# Patient Record
Sex: FEMALE | Race: WHITE
[De-identification: names, ages, dates, MRNs, and addresses within clinical notes are randomized per-mention and may not be internally consistent; named-entity substitution may affect disease eponyms.]

---

## 2020-10-01 ENCOUNTER — HOSPITAL ENCOUNTER (OUTPATIENT)
Dept: HOSPITAL 47 - WWCWWP | Age: 50
Discharge: HOME | End: 2020-10-01
Attending: SURGERY
Payer: COMMERCIAL

## 2020-10-01 VITALS
SYSTOLIC BLOOD PRESSURE: 114 MMHG | HEART RATE: 83 BPM | DIASTOLIC BLOOD PRESSURE: 75 MMHG | RESPIRATION RATE: 18 BRPM | TEMPERATURE: 98.1 F

## 2020-10-01 DIAGNOSIS — Z53.9: Primary | ICD-10-CM

## 2020-10-01 NOTE — P.GSHP
History of Present Illness


H&P Date: 10/01/20


Chief Complaint:  lobular carcinoma in situ anterior stereotactic core biopsy  

right breast








      Parris is a 49-year-old white female who was initially seen in 2019 with a complaint of some nodularity in the right axillary area.  This has 

resolved.  The patient had a bilateral mammogram performed on 7620.  After the

special views of the right breast were requested.  Those were performed on 

[address].  After review of these 2 areas of calcification were identified and 

stereotactic core biopsy of these 2 areas was recommended.  The patient does not

feel any lumps masses or nodules in her breasts.  No nipple discharge.  No 

recent history of trauma or infection in the breast.


     She underwent stereotactic core biopsy of 2 areas of concern in the right 

breast and [address]14[address].  The posterior site revealed benign fibrocystic changes.  

The anterior site revealed lobular neoplasia, ALH/LCIS.


     It has been recommended that she undergo needle localization and excisional

lumpectomy of the anterior site.  Did not have any complaints following the 

stereotactic core biopsy.








Caffeine: One pop per day


Smoke: She smokes in the evening and is exposed to secondhand smoke


Theophylline: Occasional


Hormones: Negative





Family History:


none





Hormonal history:


menarche: 11


, breast fed: no, first born at 23


periods regular


BCP: 5 years, tubal-ligation done 23 years ago


hormones:none





Surgical History:


1. tubal-ligation


2. knee arthroscopic





Medial History:


1. rheumatoid arthritis








Social History:


smoke: at night


alcohol: none


drugs: none





 


 








- Constitutional


Constitutional: Denies chills, Denies fever





- EENT


Comment: 





wears glasses


Eyes: denies blurred vision, denies pain


Ears: deny: decreased hearing, tinnitus


Ears, nose, mouth and throat: Denies headache, Denies sore throat





- Breasts


Breasts: bilateral: as per HPI





- Cardiovascular


Cardiovascular: Denies chest pain, Denies shortness of breath





- Respiratory


Respiratory: Denies cough, Denies 7





- Gastrointestinal


Gastrointestinal: Denies abdominal pain, Denies diarrhea, Denies nausea, Denies 

vomiting





- Genitourinary (Female)


Genitourinary: Denies dysuria, Denies hematuria





- Menstruation


Menstruation: Reports postmenopausal





- Musculoskeletal


Comment: 





arthritis





- Integumentary


Integumentary: Denies pruritus, Denies rash





- Neurological


Neurological: Denies numbness, Denies weakness





- Psychiatric


Psychiatric: Denies anxiety, Denies depression





- Endocrine


Endocrine: Denies fatigue, Denies weight change





- Hematologic/Lymphatic


Comment: 





none





- Allergic/Immunologic


Allergic/Immunologic: Reports as per HPI





Past Medical History


Past Medical History: Rheumatoid Arthritis (RA)


History of Any Multi-Drug Resistant Organisms: None Reported


Past Surgical History: Orthopedic Surgery, Tubal Ligation


Additional Past Surgical History / Comment(s): bilateral knee arthroscopy


Past Anesthesia/Blood Transfusion Reactions: No Reported Reaction


Past Psychological History: No Psychological Hx Reported


Smoking Status: Current some day smoker


Past Alcohol Use History: None Reported


Past Drug Use History: None Reported





Medications and Allergies


                                Home Medications











 Medication  Instructions  Recorded  Confirmed  Type


 


Multivit with Calcium,Iron,Min 1 each PO QAM 08/06/20 10/01/20 History





[Women's Multivitamin]    








                                    Allergies











Allergy/AdvReac Type Severity Reaction Status Date / Time


 


codeine Allergy  Rash/Hives Unverified 10/01/20 09:10


 


Penicillins Allergy  Anaphylaxis Unverified 10/01/20 09:10














Surgical - Exam


                                   Vital Signs











Temp Pulse Resp BP Pulse Ox


 


 98.1 F   83   18   114/75   97 


 


 10/01/20 09:07  10/01/20 09:07  10/01/20 09:07  10/01/20 09:07  10/01/20 09:07














BMI 26





- General


well developed, well nourished, no distress





- Eyes


normal ocular movement





- ENT


no hearing loss, no congestion





- Neck


no masses, trachea midline





- Respiratory


normal respiratory effort, clear to auscultation





- Cardiovascular


Rhythm: regular


Heart Sounds: normal: S1, S2





- Abdomen


Abdomen: soft, non tender, no guarding, no rigid, no rebound





- Integumentary





normal turgor





- Neurologic


no disoriented, no combative





- Musculoskeletal


normal gait, normal posture





- Psychiatric


oriented to time, oriented to person, oriented to place, speech is normal, 

memory intact





Breast exam:


Bra: 34C


inspection:grade 3 ptosis bilateral


palpation:


right breast: Multi-positional exam well-healed scars from prior stereo biopsy, 

no dominant masses or nodules of concern, fibrocystic changes


Right axilla: No adenopathy of concern


Left breast: Multiple positional exam fibrocystic changes, no dominant masses or

nodules of concern


Left axilla: No adenopathy of concern











Results





Bilateral mammogram and pathology results reviewed





Assessment and Plan


Assessment: 





Impression:


1.  Fibrocystic breast changes


2.  LCIS right breast anterior biopsy site


3.  History of arthritis





Plan:


1.  Right breast needle localization lumpectomy of anterior stereotactic core 

biopsy site,  possible mastopexy, possible onco plastic tissue transfer, any 

indicated procedures





Risks and benefits of the procedure discussed with the patient and she 

understands and wishes to proceed.





Cc: Dr. Martinez





encounter 30 minutes, > 50% of time in planning and counselling

## 2020-10-06 ENCOUNTER — HOSPITAL ENCOUNTER (OUTPATIENT)
Dept: HOSPITAL 47 - OR | Age: 50
Discharge: HOME | End: 2020-10-06
Attending: SURGERY
Payer: COMMERCIAL

## 2020-10-06 VITALS — TEMPERATURE: 98 F | SYSTOLIC BLOOD PRESSURE: 124 MMHG | DIASTOLIC BLOOD PRESSURE: 71 MMHG | HEART RATE: 97 BPM

## 2020-10-06 VITALS — RESPIRATION RATE: 16 BRPM

## 2020-10-06 DIAGNOSIS — Z88.5: ICD-10-CM

## 2020-10-06 DIAGNOSIS — Z98.51: ICD-10-CM

## 2020-10-06 DIAGNOSIS — N60.11: ICD-10-CM

## 2020-10-06 DIAGNOSIS — D05.11: Primary | ICD-10-CM

## 2020-10-06 DIAGNOSIS — Z98.890: ICD-10-CM

## 2020-10-06 DIAGNOSIS — Z88.0: ICD-10-CM

## 2020-10-06 DIAGNOSIS — F17.210: ICD-10-CM

## 2020-10-06 DIAGNOSIS — M06.9: ICD-10-CM

## 2020-10-06 PROCEDURE — 88307 TISSUE EXAM BY PATHOLOGIST: CPT

## 2020-10-06 PROCEDURE — 19301 PARTIAL MASTECTOMY: CPT

## 2020-10-06 PROCEDURE — 88342 IMHCHEM/IMCYTCHM 1ST ANTB: CPT

## 2020-10-06 PROCEDURE — 81025 URINE PREGNANCY TEST: CPT

## 2020-10-06 PROCEDURE — 88341 IMHCHEM/IMCYTCHM EA ADD ANTB: CPT

## 2020-10-06 PROCEDURE — 76098 X-RAY EXAM SURGICAL SPECIMEN: CPT

## 2020-10-06 PROCEDURE — 19281 PERQ DEVICE BREAST 1ST IMAG: CPT

## 2020-10-06 RX ADMIN — FENTANYL CITRATE PRN MCG: 50 INJECTION, SOLUTION INTRAMUSCULAR; INTRAVENOUS at 18:31

## 2020-10-06 RX ADMIN — FENTANYL CITRATE PRN MCG: 50 INJECTION, SOLUTION INTRAMUSCULAR; INTRAVENOUS at 18:54

## 2020-10-06 NOTE — P.DS
Providers


Attending physician: 


Lavinai Spicer





Primary care physician: 


Cydney Loving








Plan - Discharge Summary


Discharge Rx Participant: No


New Discharge Prescriptions: 


No Action


   Multivit with Calcium,Iron,Min [Women's Multivitamin] 1 each PO QAM


Discharge Medication List





Multivit with Calcium,Iron,Min [Women's Multivitamin] 1 each PO QAM 08/06/20 

[History]








Follow up Appointment(s)/Referral(s): 


Lavinia Spicer MD [STAFF PHYSICIAN] - 1 Week


Activity/Diet/Wound Care/Special Instructions: 


Prescription for Norco given to  in pre-op who will fill downstairs at 

pharmacy while patient in surgery, this is cancelled secondary to codeine 

allergy


Discharge Disposition: HOME SELF-CARE

## 2020-10-06 NOTE — P.OP
Date of Procedure: 10/06/20


Preoperative Diagnosis: 


Breast right at 12:00 stereotactic core biopsy lesion atypical lobular 

hyperplasia/lobular carcinoma in situ


Postoperative Diagnosis: 


Same


Procedure(s) Performed: 


needle Localization lumpectomy, crescent mastopexy, oncho plastic tissue 

transfer


Anesthesia: GETA


Surgeon: Lavinia Spicer


Estimated Blood Loss (ml): 5


IV fluids (ml): 850


Pathology: other (Dense breast tissue)


Condition: stable


Disposition: same day


Indications for Procedure: 








 core Biopsy with lobular carcinoma in situ


Operative Findings: 


Dense breast tissue


Description of Procedure: 


The patient is a 50-year-old white female who underwent a stereotactic core 

biopsy of the right breast.  The anterior lesion revealed lobular carcinoma in 

situ and atypical lobular hyperplasia.  She was recommended to undergo a needle 

local excisional lumpectomy.  Following needle localization of the area of 

concern in the right breast the patient was marked in the preoperative area for 

the incision for removal which was a crescent mastopexy incision.  She is 

brought to the operating room and following induction of anesthesia the right 

breast was prepped and draped in a sterile fashion.  The area which had been m

arked in the preoperative area was again delineated.  The apex of the crescent 

was approximately 1-1/2 cm above the areolar.  The skin in this area was de-

epithelialized.  The breast parenchyma was entered.  The was identified and 

circumferential dissection of the tissue around the needle was performed.  The 

specimen was painted for orientation.  The wound was evaluated for hemostasis.  

After assured that hemostasis was attained medial and lateral pillars were free.

 The medial portal was approximately 7 x 4 cm which was 28 cm and the lateral 

pillar was approximately 6 x 4 cm which was 24 cm.  Anterolateral 52 cm was 

freed.  After assured that hemostasis was attained the 2 pillars were brought 

together using 3-0 Vicryl suture.  The broad of the biopsy cavity was marked 

using titanium clips.  One clip was medial and 3 clips superiorly.  Following 

this the subcutaneous tissue was closed with 3-0 Vicryl suture.  This is 

followed by 4-0 subcuticular 4-0 Ethibond suture and a 4-0 nylon skin suture.  

All instrument and sponge counts were correct at the end of the case.  Patient 

tolerated the procedure in stable condition.

## 2020-10-07 NOTE — MM
EXAMINATION TYPE: MG pre op needle loc RT, MG surgical specimen RT

 

DATE OF EXAM: 10/6/2020

 

COMPARISON: Prior mammograms July 31, 2020 and older studies.

 

CLINICAL HISTORY: Recent High risk biopsy with ALH/LCIS.

 

TECHNIQUE: Needle localization with wire placement and surgical excision of area
of concern in the right breast.  

 

FINDINGS: The procedure of needle localization with wire placement and than 
surgical excision was explained to the patient.  Benefits, alternatives, and 
risks were discussed.  An informed consent was then obtained.  

 

Exam is reviewed. More anterior clip corresponds to area of concern on 
pathology. The shortest pathway for procedure was chosen.  Shortest pathway was 
lateral approach. The overlying skin was prepped and draped in usual sterile 
fashion.  Lidocaine is used as anesthetic into the skin and subcutaneous tissue 
up to the level of area of concern.  A 7 cm needle was used.  It was placed via 
a lateral approach under mammographic guidance.  Subsequent 90 degrees mammogram
show the needle to be in satisfactory position relative to the targeted area.  
At this point, wire was placed and the needle was withdrawn.  The wire was fixed
to patient's skin.  Images were marked for surgeon.  

 

The patient tolerated the procedure well without any immediate complication.  
The patient was kept in the radiology department for short stay after the 
procedure and then taken to surgery for surgical excision. 

 

 Targeted clip and wire are identified in specimen mammogram.  The patient was 
kept in hospital for short stay after the procedure and then discharged home in 
stable condition. The nontargeted clip also seen in periphery of specimen.

 

IMPRESSION: Successful, uncomplicated needle localization with wire placement 
and surgical excision of targeted clip in the right breast, full pathology 
results to follow.  

 

Pathology Results: Malignant

 

RIGHT BREAST, LUMPECTOMY:  Duct carcinoma in situ, intermediate grade less than 
2 mm away from the black inked margin of resection in multiple portions of the 
specimen.

Surrounding breast parenchyma:  Fibrocystic spectrum changes with prior biopsy 
artifact.  See note. 



Recommendation

Surgical consult of the right breast.

TRUNG

## 2020-10-15 ENCOUNTER — HOSPITAL ENCOUNTER (OUTPATIENT)
Dept: HOSPITAL 47 - WWCWWP | Age: 50
Discharge: HOME | End: 2020-10-15
Attending: SURGERY
Payer: COMMERCIAL

## 2020-10-15 VITALS
SYSTOLIC BLOOD PRESSURE: 108 MMHG | DIASTOLIC BLOOD PRESSURE: 73 MMHG | TEMPERATURE: 98.1 F | HEART RATE: 67 BPM | RESPIRATION RATE: 12 BRPM

## 2020-10-15 DIAGNOSIS — Z53.9: Primary | ICD-10-CM

## 2020-10-15 NOTE — P.PN
Progress Note - Text


Progress Note Date: 10/15/20





     Parris is a 50 -year-old female who underwent a right breast needle local

excisional lumpectomy for an area of atypical lobular hyperplasia.  Pathology 

came back as ductal carcinoma in situ which was less than 2 mm away from the 

black inked margin of resection and multiple portions of the specimen.  This was

the superior margin.  Patient has done well postoperatively.  The lesion is 

ER/NV positive





physical exam:


Lungs: Clear


Heart: Regular rate and rhythm


Incision: Clean and dry





Impression:


1.  Excisional biopsy positive for DCIS, less than 2 mm from black inked margin 

of resection of multiple portions of the specimen





Plan:


1.  Reexcision of superior margin


2.  Appointment with radiation oncology and medical oncology


2.  Present case at tumor board





CC: Dr. Loving

## 2020-10-30 NOTE — P.PN
Subjective


Progress Note Date: 10/30/20


Principal diagnosis: 





Ductal carcinoma in situ right breast


Parris is a 49-year-old white female who was initially seen in 2019 

with a complaint of some nodularity in the right axillary area.  This has 

resolved.  The patient had a bilateral mammogram performed on 7620.  After the

special views of the right breast were requested.  Those were performed on 

[address].  After review of these 2 areas of calcification were identified and 

stereotactic core biopsy of these 2 areas was recommended.  The patient does not

feel any lumps masses or nodules in her breasts.  No nipple discharge.  No 

recent history of trauma or infection in the breast.


     She underwent stereotactic core biopsy of 2 areas of concern in the right 

breast and [address]2872.  The posterior site revealed benign fibrocystic changes.  

The anterior site revealed lobular neoplasia, ALH/LCIS.


     It has been recommended that she undergo needle localization and excisional

lumpectomy of the anterior site.  Did not have any complaints following the 

stereotactic core biopsy.


She underwent needle localization and excisional biopsy for the area of atypical

lobular hyperplasia.  Pathology returned as ductal carcinoma in situ which was 

less than 2 mm from the superior resection margin.  This was noted in multiple 

portions of the specimen.  Although the margin was negative it was very close.  

The case was presented at tumor Board and she was seen by radiation oncology.  

Recommendation was for repeat excision of the superior margin.  Additionally we 

discussed the possibility of an invasive component and the patient wished to 

undergo a sentinel node biopsy.








Caffeine: One pop per day


Smoke: She smokes in the evening and is exposed to secondhand smoke


Theophylline: Occasional


Hormones: Negative





Family History:


none





Hormonal history:


menarche: 11


, breast fed: no, first born at 23


periods regular


BCP: 5 years, tubal-ligation done 23 years ago


hormones:none





Surgical History:


1. tubal-ligation


2. knee arthroscopic





Medial History:


1. rheumatoid arthritis








Social History:


smoke: at night


alcohol: none


drugs: none





 


 








- Constitutional


Constitutional: Denies chills, Denies fever





- EENT


Comment: 





wears glasses


Eyes: denies blurred vision, denies pain


Ears: deny: decreased hearing, tinnitus


Ears, nose, mouth and throat: Denies headache, Denies sore throat





- Breasts


Breasts: bilateral: as per HPI





- Cardiovascular


Cardiovascular: Denies chest pain, Denies shortness of breath





- Respiratory


Respiratory: Denies cough,





- Gastrointestinal


Gastrointestinal: Denies abdominal pain, Denies diarrhea, Denies nausea, Denies 

vomiting





- Genitourinary (Female)


Genitourinary: Denies dysuria, Denies hematuria





- Menstruation


Menstruation: Reports postmenopausal





- Musculoskeletal


Comment: 





arthritis





- Integumentary


Integumentary: Denies pruritus, Denies rash





- Neurological


Neurological: Denies numbness, Denies weakness





- Psychiatric


Psychiatric: Denies anxiety, Denies depression





- Endocrine


Endocrine: Denies fatigue, Denies weight change





- Hematologic/Lymphatic


Comment: 





none





- Allergic/Immunologic


Allergic/Immunologic: Reports as per HPI











Objective





- Constitutional


General appearance: Present: average body habitus





- EENT


Eyes: Present: EOMI


ENT: Present: hearing grossly normal





- Neck


Neck: Present: normal ROM





- Respiratory


Respiratory: bilateral: CTA





- Cardiovascular


Rhythm: regular


Heart sounds: normal: S1, S2





- Gastrointestinal


General gastrointestinal: Present: soft





- Integumentary


Integumentary Comment(s): 





Incision: Clean and dry


Integumentary: Present: normal turgor





- Musculoskeletal


Musculoskeletal: Present: gait normal





- Psychiatric


Psychiatric: Present: A&O x's 3, appropriate affect, intact judgment & insight





Assessment and Plan


Assessment: 





Impression:


1.  Excisional biopsy right breast positive for DCIS less than 2 mm from black 

inked margin of resection of multiple portions of the specimen





Plan:


1.  Reexcision of superior margin


2.  Davy node injection, sentinel node biopsy, possible axillary node 

dissection





Risk and benefits of the procedure were discussed with the patient.  She 

understands that this may not be any residual tumor however with multiple sites 

showing very close proximity to the superior margin and after presentation at 

tumor board has been recommended reexcision be performed.  We also discussed 

sentinel node biopsy and secondary to the multiple sites she wishes sentinel 

node biopsy to be performed.  Risks include but are not limited to bleeding, 

infection, reaction to the anesthetic.  She understands this margin toward to be

positive  she may need additional surgery.





CC: Dr. Loving

## 2020-11-03 ENCOUNTER — HOSPITAL ENCOUNTER (OUTPATIENT)
Dept: HOSPITAL 47 - OR | Age: 50
Discharge: HOME | End: 2020-11-03
Attending: SURGERY
Payer: COMMERCIAL

## 2020-11-03 VITALS — TEMPERATURE: 97.1 F

## 2020-11-03 VITALS — RESPIRATION RATE: 18 BRPM

## 2020-11-03 VITALS — BODY MASS INDEX: 25.8 KG/M2

## 2020-11-03 VITALS — HEART RATE: 77 BPM | SYSTOLIC BLOOD PRESSURE: 103 MMHG | DIASTOLIC BLOOD PRESSURE: 57 MMHG

## 2020-11-03 DIAGNOSIS — Z98.51: ICD-10-CM

## 2020-11-03 DIAGNOSIS — N60.91: ICD-10-CM

## 2020-11-03 DIAGNOSIS — F17.210: ICD-10-CM

## 2020-11-03 DIAGNOSIS — C50.911: ICD-10-CM

## 2020-11-03 DIAGNOSIS — M06.9: ICD-10-CM

## 2020-11-03 DIAGNOSIS — K21.9: ICD-10-CM

## 2020-11-03 DIAGNOSIS — Z88.5: ICD-10-CM

## 2020-11-03 DIAGNOSIS — N60.11: Primary | ICD-10-CM

## 2020-11-03 DIAGNOSIS — Z98.890: ICD-10-CM

## 2020-11-03 DIAGNOSIS — Z88.0: ICD-10-CM

## 2020-11-03 DIAGNOSIS — Z77.22: ICD-10-CM

## 2020-11-03 PROCEDURE — 88307 TISSUE EXAM BY PATHOLOGIST: CPT

## 2020-11-03 PROCEDURE — 88342 IMHCHEM/IMCYTCHM 1ST ANTB: CPT

## 2020-11-03 PROCEDURE — 81025 URINE PREGNANCY TEST: CPT

## 2020-11-03 PROCEDURE — 88341 IMHCHEM/IMCYTCHM EA ADD ANTB: CPT

## 2020-11-03 PROCEDURE — 38525 BIOPSY/REMOVAL LYMPH NODES: CPT

## 2020-11-03 PROCEDURE — 19301 PARTIAL MASTECTOMY: CPT

## 2020-11-03 PROCEDURE — 38792 RA TRACER ID OF SENTINL NODE: CPT

## 2020-11-03 NOTE — P.NAPBC
NAPBC Queries





- NAPBC Queries


Was patient's case review presented at [facility] tumor board? If no, comment.: Yes


Was patient's pathology reviewed at [facility]? If no, comment.: Yes


Was breast conservation surgery offered? If no, comment.: Yes


Was sentinel node biopsy offered? If no, comment.: Yes


Was diagnosis confirmed by percutaneous core biopsy? If no, comment.: No (biopsy

of atypia on core lead to diagnosis)


Is patient mastectomy patient?: No


Was a preop referral to reconstructive surgeon offered?: No


Clinical Stage: 





stage 0

## 2020-11-03 NOTE — NM
EXAMINATION TYPE: NM sentinel node injection

 

DATE OF EXAM: 11/3/2020

 

COMPARISON: 10/6/2020

 

HISTORY: 50-year-old female right breast cancer, scheduled for reexcision of the superior margin.

 

TECHNIQUE AND FINDINGS: The procedure of sentinel lymph node injection was explained to the patient. 
 The benefits, alternatives, and risks were discussed.  An informed consent was then obtained.  

 

Overlying skin is cleaned with sterile alcohol.  Following this, 553 uCi Tc99m Tilmanocept was inject
ed in the upper outer aspect of the right nipple intradermally.  

 

The patient tolerated the procedure well without any immediate complication.  The patient was kept in
 the radiology department for short stay after the procedure and then taken to surgery for surgical p
rocedure what is presumed intraoperative gamma probe will be used for sentinel lymph node detection. 
 

 

 

IMPRESSION:  

Right breast radiotracer injection for sentinel node localization as above.

## 2020-11-03 NOTE — P.OP
Date of Procedure: 11/03/20


Preoperative Diagnosis: 


Ductal carcinoma in situ with multiple areas less than 2 mm from the superior 

margin of lumpectomy cavity


Postoperative Diagnosis: 


Same


Procedure(s) Performed: 


Mondamin node biopsy, reexcision of superior margin of lumpectomy cavity


Anesthesia: ANJALIA


Surgeon: Lavinia Spicer


Estimated Blood Loss (ml): 10


IV fluids (ml): 700


Pathology: other (Mondamin lymph node, breast tissue)


Condition: stable


Disposition: same day


Indications for Procedure: 


Needle localization partial mastectomy for atypia which revealed ductal 

carcinoma in situ with close margins at the superior aspect


Operative Findings: 


Dense breast tissue


Description of Procedure: 


Parris is a 50-year-old patient female who underwent a needle localization 

partial mastectomy for atypia.  The pathology revealed ductal carcinoma in situ 

with multiple areas less than 2 mm from the superior margin.  Her case was 

presented at tumor Board and it was recommended she undergo reexcision of the 

superior margin.  Additionally secondary to the multifocal nature sentinel node 

biopsy was discussed with the patient and she wished this to be performed in the

event that there should be an invasive component identified.





The patient was brought to the operating room and the axilla was interrogated.  

Lymphokine had been injected by radiology.  The area was noted to be radioactive

and following this the breast and axilla were prepped and draped in a sterile 

fashion.  An incision was made in the axilla and carried down to the area of 

increased radioactivity.  This was a axillary lymph node.  Using the Harmonic 

scalpel this lymph node was excised.  The 10 second count on the lymph node was 

17,275 and the background count was 18.  After assured that hemostasis was 

attained, the deep tissues were closed using 3-0 Vicryl suture.  This was 

followed by closure of the skin with a 4-0 Monocryl.  Following this the area of

the breast was approached.  The prior crescent mastopexy incision was reopened. 

This was followed superiorly to the area where the cavity had been resected and 

the tissue closed.  A small cavity was noted and this area was opened.  The 

staples had been placed superiorly 1-year-old and these were identified.  The 

superior aspect of the cavity was reexcised.  A portion of tissue was removed 

from the medial aspect of the cavity removing the staples from this area as 

well.


     Patient was examined for hemostasis.  After assured that hemostasis was 

attained Surgicel and pelvis on was placed.  Two titanium clips were placed 

superiorly and one medial.  The specimen was painted for hemostasis.  The cavity

which was reexcised was approximately 5 cm x 2 cm.


 The deep tissues were closed using 3-0 Vicryl suture.  This is followed by 

closure of the subcutaneous tissue with 3-0 Vicryl suture and closure of the 

skin with a 4-0 Monocryl followed by 4-0 nylon suture.  The patient tolerated 

procedure in stable condition.  All instrument and sponge counts were correct at

the end of the case.

## 2020-11-03 NOTE — P.DS
Providers


Attending physician: 


Lavinia Spicer





Primary care physician: 


Cydney Loving








Plan - Discharge Summary


Discharge Rx Participant: Yes


New Discharge Prescriptions: 


No Action


   Multivit with Calcium,Iron,Min [Women's Multivitamin] 1 each PO QAM


Discharge Medication List





Multivit with Calcium,Iron,Min [Women's Multivitamin] 1 each PO QAM 08/06/20 

[History]








Follow up Appointment(s)/Referral(s): 


Lavinia Spicer MD [STAFF PHYSICIAN] - 1 Week


Patient Instructions/Handouts:  *Surgery [facility] - Scopalamine Patch Instructions


Activity/Diet/Wound Care/Special Instructions: 


do not drive for 24 hours after discharge


may shower after 48 hours


wear bra at all times


Discharge Disposition: HOME SELF-CARE

## 2020-11-13 ENCOUNTER — HOSPITAL ENCOUNTER (OUTPATIENT)
Dept: HOSPITAL 47 - WWCWWP | Age: 50
Discharge: HOME | End: 2020-11-13
Attending: SURGERY
Payer: COMMERCIAL

## 2020-11-13 VITALS — HEART RATE: 81 BPM | SYSTOLIC BLOOD PRESSURE: 113 MMHG | RESPIRATION RATE: 18 BRPM | DIASTOLIC BLOOD PRESSURE: 72 MMHG

## 2020-11-13 DIAGNOSIS — Z53.9: Primary | ICD-10-CM

## 2020-11-13 NOTE — P.PN
Progress Note - Text


Progress Note Date: 11/13/20





Parris is a 50-year-old white female status post right breast lumpectomy on 

10620 which revealed DCIS close to the superior margin of resection.  She 

therefore underwent reexcision on 11320.  On reexcision  of the DCIS was 

noted.  She also had a sentinel node biopsy performed.  Chandler node was 

negative for metastatic disease.  The patient is doing well post procedure 

without complaints.





Physical exam:


Incision axilla clean and dry


Incision periareolar region clean and dry





Impression:


1.  DCIS completely excised


2.  Patient doing well postop





Plan:


1.  Follow-up radiation oncology


2.  Follow-up medical oncology


3.  Follow.  In 3 months





CC: Dr. Loving

## 2021-02-18 ENCOUNTER — HOSPITAL ENCOUNTER (OUTPATIENT)
Dept: HOSPITAL 47 - WWCWWP | Age: 51
Discharge: HOME | End: 2021-02-18
Attending: SURGERY
Payer: COMMERCIAL

## 2021-02-18 VITALS
DIASTOLIC BLOOD PRESSURE: 77 MMHG | SYSTOLIC BLOOD PRESSURE: 125 MMHG | TEMPERATURE: 98 F | HEART RATE: 67 BPM | RESPIRATION RATE: 18 BRPM

## 2021-02-18 DIAGNOSIS — Z53.9: Primary | ICD-10-CM

## 2021-02-18 NOTE — P.PN
Subjective


Progress Note Date: 21


Principal diagnosis: 





Stage 0 right breast cancer


 Parris is a 50-year-old white female who was initially seen in 2019 

with a complaint of some nodularity in the right axillary area.  This resolved. 

The patient had a bilateral mammogram performed on 7620.  Special views of the

right breast were requested.  Those [place] performed on [address].  After review of 

these 2 areas of calcification were identified and stereotactic core biopsy of 

these 2 areas was recommended.  The patient did not feel any lumps masses or 

nodules in her breasts.  No nipple discharge.  No recent history of trauma or 

infection in the breast.


     She underwent stereotactic core biopsy of 2 areas of concern in the right 

breast and 49044.  The posterior site revealed benign fibrocystic changes.  

The anterior site revealed lobular neoplasia, ALH/LCIS.


     It has been recommended that she undergo needle localization and excisional

lumpectomy of the anterior site.  Did not have any complaints following the 

stereotactic core biopsy.  The initial needle local resection was done on 

.  This revealed DCIS.  The patient subsequently on 0[address] underwent a 

reexcision and a sentinel node biopsy.  Pathology at that time revealed no 

further DCIS but some atypia on the medial reexcision margin.  A sentinel node 

biopsy was also removed which was negative for cancer.





     She did complete a course of radiation therapy.  This was completed on 

21.  She was given a prescription for an anti estrogen agent, the patient 

has not started this yet.





She does not feel any lumps masses or nodules of concern in either breast.  She 

is concerned about the fact that her right nipple areolar complex are higher 

than her left nipple areolar complex, it is difficult for her to find close to 

fit correctly.  She would like to have this corrected, the reason for the 

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








Caffeine: One pop per day


Smoke: She smokes in the evening and is exposed to secondhand smoke


Theophylline: Occasional


Hormones: Negative





Family History:


none





Hormonal history:


menarche: 11


, breast fed: no, first born at 23


periods regular


BCP: 5 years, tubal-ligation done 23 years ago


hormones:none





Surgical History:


1. tubal-ligation


2. knee arthroscopic





Medial History:


1. rheumatoid arthritis








Social History:


smoke: at night


alcohol: none


drugs: none





 


 








- Constitutional


Constitutional: Denies chills, Denies fever





- EENT


Comment: 





wears glasses


Eyes: denies blurred vision, denies pain


Ears: deny: decreased hearing, tinnitus


Ears, nose, mouth and throat: Denies headache, Denies sore throat





- Breasts


Breasts: bilateral: as per HPI





- Cardiovascular


Cardiovascular: Denies chest pain, Denies shortness of breath





- Respiratory


Respiratory: Denies cough





- Gastrointestinal


Gastrointestinal: Denies abdominal pain, Denies diarrhea, Denies nausea, Denies 

vomiting





- Genitourinary (Female)


Genitourinary: Denies dysuria, Denies hematuria





- Menstruation


Menstruation: Reports postmenopausal





- Musculoskeletal


Comment: 





arthritis





- Integumentary


Integumentary: Denies pruritus, Denies rash





- Neurological


Neurological: Denies numbness, Denies weakness





- Psychiatric


Psychiatric: Denies anxiety, Denies depression





- Endocrine


Endocrine: Denies fatigue, Denies weight change





- Hematologic/Lymphatic


Comment: 





none





- Allergic/Immunologic


Allergic/Immunologic: Reports as per HPI











Objective





- Exam





BMI 26.2





- Constitutional


General appearance: Present: average body habitus





- EENT


Eyes: Present: EOMI


ENT: Present: hearing grossly normal





- Neck


Neck: Present: normal ROM





- Respiratory


Respiratory: bilateral: CTA





- Cardiovascular


Rhythm: regular


Heart sounds: normal: S1, S2





- Gastrointestinal


General gastrointestinal: Present: normal bowel sounds, soft





- Integumentary


Integumentary: Present: normal turgor





- Musculoskeletal


Musculoskeletal: Present: gait normal





- Psychiatric


Psychiatric: Present: A&O x's 3, appropriate affect





- Additional findings


Additional findings: 





breast:


BRA: medium sports bra


inspection: Asymmetry of the breast related to prior right breast lumpectomy, 

right nipple complex grade 2 ptosis left nipple complex grade 3 ptosis 


Palpation:


Right breast: Radiation changes dense breast tissue no discrete dominant masses 

or nodules of concern


Right axilla: No adenopathy of concern


Left breast: Multi-positional exam increased nodularity upper outer quadrant 

region


Left axilla: No adenopathy of concern





Assessment and Plan


Assessment: 





Impression:


1.  Patient status post lumpectomy and radiation therapy for right breast ductal

carcinoma in situ this was done 11-3-20, radiation completed on 2021.


2.  Last bilateral mammogram was in 2020, at this time patient does have 

increased nodularity in the upper quadrant of the left breast with recommend a 

left breast mammogram


3.  Left breast ultrasound of the area of increased palpable abnormality


4.  Possible left breast core biopsy


5.  After workup of the left breast the patient wishes for a symmetry procedure 

to be performed secondary to the change in symmetry of the areolar complexes 

related to surgery for the ductal carcinoma in situ.





Plan:


1.  Left breast mammogram


2.  Left breast ultrasound


3.  Left breast core biopsy


5.  Consider MRI


6.  After workup of the left breast and left breast mastopexy to be performed





Cc: Dr. Loving





Encounter 25 minutes, time spent in reviewing medical records, physical 

examination, and counseling.

## 2021-03-10 ENCOUNTER — HOSPITAL ENCOUNTER (OUTPATIENT)
Dept: HOSPITAL 47 - RADMAMWWP | Age: 51
Discharge: HOME | End: 2021-03-10
Attending: SURGERY
Payer: COMMERCIAL

## 2021-03-10 DIAGNOSIS — N63.21: Primary | ICD-10-CM

## 2021-03-10 DIAGNOSIS — R92.8: ICD-10-CM

## 2021-03-10 PROCEDURE — 77065 DX MAMMO INCL CAD UNI: CPT

## 2021-03-10 NOTE — USB
Reason for exam: additional evaluation requested from abnormal screening.



History:

Patient has history of breast cancer at age 50 and has history of high-risk lesion

on a previous biopsy at age 49.

Malignant MG pre op needle loc RT of the right breast, October 6, 2020.  

Lumpectomy of the right breast, October 6, 2020.  High risk MG stereo VAD BX addl 

RT of the right breast, July 31, 2020.  Benign MG stereo VAD BX RT of the right 

breast, July 31, 2020.

Taking antineoplastic beginning at age 50.



US Breast LT

Technologist: Karli Salas

Left complete breast ultrasound includes all four quadrants, the retroareolar 

region and axilla. Finding demonstrates no cystic or solid lesion seen. Dense 

tissues at the upper outer quadrant palpable area. Patient with right lumpectomy 

in November 2020.



These results were verbally communicated with the patient and result sheet given 

to the patient on 3/10/21.





ASSESSMENT: Benign, BI-RAD 2



RECOMMENDATION:

Follow-up diagnostic mammogram of both breasts in 6 months.

Manage on a clinical basis with regard to any suspicious palpable.

## 2021-03-10 NOTE — MM
Reason for exam: clinical finding.

Last mammogram was performed 8 months ago.



History:

Patient has history of breast cancer at age 50 and has history of high-risk lesion

on a previous biopsy at age 49.

Malignant MG pre op needle loc RT of the right breast, October 6, 2020.  

Lumpectomy of the right breast, October 6, 2020.  High risk MG stereo VAD BX addl 

RT of the right breast, July 31, 2020.  Benign MG stereo VAD BX RT of the right 

breast, July 31, 2020.

Taking antineoplastic beginning at age 50.



Physical Findings:

Nurse did not find any significant physical abnormalities on exam.



MG Diagnostic Mammo LT w CAD

CC and MLO view(s) were taken of the left breast.

Prior study comparison: July 10, 2020, right breast MG work up mamm w CAD RT.  

July 6, 2020, bilateral MG screening mammo w CAD.  September 3, 2019, right breast

US breast limited RT.  February 14, 2017, mammogram, performed at [facility].

The breast tissue is heterogeneously dense. This may lower the sensitivity of 

mammography.  Skin marker at area of interest upper outer quadrant.

No significant new findings when compared with previous films.



These results were verbally communicated with the patient and result sheet given 

to the patient on 3/10/21.





ASSESSMENT: Incomplete: need additional imaging evaluation, BI-RAD 0



RECOMMENDATION:

Ultrasound of the left breast.

## 2021-03-18 ENCOUNTER — HOSPITAL ENCOUNTER (OUTPATIENT)
Dept: HOSPITAL 47 - WWCWWP | Age: 51
End: 2021-03-18
Attending: SURGERY
Payer: COMMERCIAL

## 2021-03-18 VITALS
RESPIRATION RATE: 16 BRPM | HEART RATE: 80 BPM | SYSTOLIC BLOOD PRESSURE: 117 MMHG | TEMPERATURE: 98.4 F | DIASTOLIC BLOOD PRESSURE: 74 MMHG

## 2021-03-18 DIAGNOSIS — F17.200: ICD-10-CM

## 2021-03-18 DIAGNOSIS — N63.21: Primary | ICD-10-CM

## 2021-03-18 DIAGNOSIS — Z98.890: ICD-10-CM

## 2021-03-18 NOTE — P.PN
Subjective


Progress Note Date: 21


Principal diagnosis: 





Asymmetry of the breasts secondary to cancer surgery in the right breast


Stage 0 right breast cancer


 Parris is a 50-year-old white female who was initially seen in 2019 

with a complaint of some nodularity in the right axillary area.  This resolved. 

The patient had a bilateral mammogram performed on 7620.  Special views of the

right breast were requested.  Those [place] performed on [address].  After review of 

these 2 areas of calcification were identified and stereotactic core biopsy of 

these 2 areas was recommended.  The patient did not feel any lumps masses or 

nodules in her breasts.  No nipple discharge.  No recent history of trauma or 

infection in the breast.


     She underwent stereotactic core biopsy of 2 areas of concern in the right 

breast and 35655.  The posterior site revealed benign fibrocystic changes.  

The anterior site revealed lobular neoplasia, ALH/LCIS.


     It has been recommended that she undergo needle localization and excisional

lumpectomy of the anterior site.  Did not have any complaints following the 

stereotactic core biopsy.  The initial needle local resection was done on 10-

620.  This revealed DCIS.  The patient subsequently on 29233 underwent a 

reexcision and a sentinel node biopsy.  Pathology at that time revealed no 

further DCIS but some atypia on the medial reexcision margin.  A sentinel node 

biopsy was also removed which was negative for cancer.





     She did complete a course of radiation therapy.  This was completed on 

21.  She was given a prescription for an anti estrogen agent, the patient 

has not started this yet. This is tamoxifen.  She had blood work done which 

showed that she was premenopausal.





A left breast mammogram and ultrasound performed on 56212.  These were felt to

be benign BIRADS 2 follow-up diagnostic mammogram of both breasts in 6 months 

time was recommended.





She does not feel any lumps masses or nodules of concern in either breast.  She 

is concerned about the fact that her right nipple areolar complex are higher 

than her left nipple areolar complex, it is difficult for her to find close to 

fit correctly.  She would like to have this corrected, the reason for the 

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








Caffeine: One pop per day


Smoke: She smokes in the evening and is exposed to secondhand smoke


Theophylline: Occasional


Hormones: Negative





Family History:


none





Hormonal history:


menarche: 11


, breast fed: no, first born at 23


periods regular


BCP: 5 years, tubal-ligation done 23 years ago


hormones:none





Surgical History:


1. tubal-ligation


2. knee arthroscopic





Medial History:


1. rheumatoid arthritis








Social History:


smoke: at night


alcohol: none


drugs: none





 


 








- Constitutional


Constitutional: Denies chills, Denies fever





- EENT


Comment: 





wears glasses


Eyes: denies blurred vision, denies pain


Ears: deny: decreased hearing, tinnitus


Ears, nose, mouth and throat: Denies headache, Denies sore throat





- Breasts


Breasts: bilateral: as per HPI





- Cardiovascular


Cardiovascular: Denies chest pain, Denies shortness of breath





- Respiratory


Respiratory: Denies cough





- Gastrointestinal


Gastrointestinal: Denies abdominal pain, Denies diarrhea, Denies nausea, Denies 

vomiting





- Genitourinary (Female)


Genitourinary: Denies dysuria, Denies hematuria





- Menstruation


Menstruation: Reports postmenopausal





- Musculoskeletal


Comment: 





arthritis





- Integumentary


Integumentary: Denies pruritus, Denies rash





- Neurological


Neurological: Denies numbness, Denies weakness





- Psychiatric


Psychiatric: Denies anxiety, Denies depression





- Endocrine


Endocrine: Denies fatigue, Denies weight change





- Hematologic/Lymphatic


Comment: 





none





- Allergic/Immunologic


Allergic/Immunologic: Reports as per HPI











Objective





- Vital Signs


Vital signs: 


                                   Vital Signs











Temp  98.4 F   21 15:02


 


Pulse  80   21 15:02


 


Resp  16   21 15:02


 


BP  117/74   21 15:02


 


Pulse Ox  98   21 15:02








                                 Intake & Output











 21





 18:59 06:59 18:59


 


Weight   65.771 kg














- Exam





BMI 25.7





- Constitutional


General appearance: Present: average body habitus





- EENT


Eyes: Present: EOMI


ENT: Present: hearing grossly normal





- Neck


Neck: Present: normal ROM





- Respiratory


Respiratory: bilateral: CTA





- Cardiovascular


Rhythm: regular


Heart sounds: normal: S1, S2





- Gastrointestinal


General gastrointestinal: Present: soft





- Integumentary


Integumentary: Present: normal turgor





- Musculoskeletal


Musculoskeletal: Present: gait normal





- Psychiatric


Psychiatric: Present: A&O x's 3, appropriate affect, intact judgment & insight





- Additional findings


Additional findings: 





breast exam:


BRA: medium sports bra/ 36B


inspection: Grade 1 ptosis right breast/grade 2/3 ptosis left breast; the right 

nipple areolar complex is higher than the left nipple areolar complex there are 

well-healed scars related to prior surgery


Palpation:


Right breast: Multiple positional exam no dominant masses or nodules of concern,

no evidence of recurrent cancer


Right axilla: No adenopathy of concern


Left breast: Multiple positional exam fibrocystic changes, mild persistant 

nodularity in the UOQ


Left axilla: No adenopathy of concern








Assessment and Plan


Assessment: 





Impression:


1.  Patient status post lumpectomy and radiation therapy for right breast DCIS 

and , radiation completed on 


2.  Last bilateral mammogram 2020, patient had a left breast mammogram and 

ultrasound performed on  will have bilateral mammogram in 6 months


3.  Mild persistent nodularity in the upper outer quadrant region of the left 

breast near the 3 o'clock position recommend core biopsy





Plan:


1.  Left breast core biopsy


2.  Left breast mastopexy after completion of workup





CC: Dr. Loving





encounter 20 minutes time spent in reviewing medical records, examination, and 

counselling.

## 2021-03-19 ENCOUNTER — HOSPITAL ENCOUNTER (OUTPATIENT)
Dept: HOSPITAL 47 - WWCWWP | Age: 51
End: 2021-03-19
Attending: SURGERY
Payer: COMMERCIAL

## 2021-03-19 VITALS
RESPIRATION RATE: 18 BRPM | DIASTOLIC BLOOD PRESSURE: 82 MMHG | SYSTOLIC BLOOD PRESSURE: 112 MMHG | HEART RATE: 67 BPM | TEMPERATURE: 98 F

## 2021-03-19 DIAGNOSIS — N63.20: Primary | ICD-10-CM

## 2021-03-19 DIAGNOSIS — F17.200: ICD-10-CM

## 2021-03-19 NOTE — P.PCN
Date of Procedure: 03/19/21


Preoperative Diagnosis: 


Nodularity left breast approximately 3 o'clock position


Postoperative Diagnosis: 


same


Anesthesia: local


Surgeon: Lavinia Spicer


Pathology: other (breast tissue)


Condition: stable


Disposition: same day


Indications for Procedure: 


Nodularity left breast 3 o'clock position


Operative Findings: 


Dense breast tissue


Description of Procedure: 


Risks and benefits of the procedure were discussed with the patient.  She wished

to proceed with a core biopsy of the area of palpable increased nodularity 3 

o'clock position of the left breast.  No radiographic correlate was noted.  The 

area was prepped using Betadine.  Approximately 5 mL of 1% lidocaine were used 

to anesthetize the area of concern.  15 blade needle was used to make a small 

nick in the skin.  A 20-gauge Bard sponge and fired core biopsy needle was 

inserted into the area of palpable abnormality.  3 specimens were obtained.  

Specimens were sent to pathology.  Steri-Strip was applied to the incision in 

the breast.  Patient tolerated the procedure in stable condition.  Patient will 

follow up for results next week.

## 2021-03-30 ENCOUNTER — HOSPITAL ENCOUNTER (OUTPATIENT)
Dept: HOSPITAL 47 - OR | Age: 51
Discharge: HOME | End: 2021-03-30
Attending: SURGERY
Payer: COMMERCIAL

## 2021-03-30 VITALS — DIASTOLIC BLOOD PRESSURE: 64 MMHG | HEART RATE: 64 BPM | SYSTOLIC BLOOD PRESSURE: 112 MMHG

## 2021-03-30 VITALS — BODY MASS INDEX: 24.7 KG/M2

## 2021-03-30 VITALS — TEMPERATURE: 97 F

## 2021-03-30 VITALS — RESPIRATION RATE: 16 BRPM

## 2021-03-30 DIAGNOSIS — Z77.22: ICD-10-CM

## 2021-03-30 DIAGNOSIS — Z78.0: ICD-10-CM

## 2021-03-30 DIAGNOSIS — Z88.0: ICD-10-CM

## 2021-03-30 DIAGNOSIS — F17.210: ICD-10-CM

## 2021-03-30 DIAGNOSIS — N60.11: ICD-10-CM

## 2021-03-30 DIAGNOSIS — N64.81: ICD-10-CM

## 2021-03-30 DIAGNOSIS — Z98.890: ICD-10-CM

## 2021-03-30 DIAGNOSIS — Z92.3: ICD-10-CM

## 2021-03-30 DIAGNOSIS — N64.89: Primary | ICD-10-CM

## 2021-03-30 DIAGNOSIS — L08.9: ICD-10-CM

## 2021-03-30 DIAGNOSIS — M06.9: ICD-10-CM

## 2021-03-30 DIAGNOSIS — Z88.5: ICD-10-CM

## 2021-03-30 DIAGNOSIS — Z79.810: ICD-10-CM

## 2021-03-30 DIAGNOSIS — N63.21: ICD-10-CM

## 2021-03-30 DIAGNOSIS — D05.11: ICD-10-CM

## 2021-03-30 DIAGNOSIS — N60.12: ICD-10-CM

## 2021-03-30 DIAGNOSIS — Z98.51: ICD-10-CM

## 2021-03-30 DIAGNOSIS — Z97.3: ICD-10-CM

## 2021-03-30 PROCEDURE — 88305 TISSUE EXAM BY PATHOLOGIST: CPT

## 2021-03-30 PROCEDURE — 81025 URINE PREGNANCY TEST: CPT

## 2021-03-30 PROCEDURE — 19316 MASTOPEXY: CPT

## 2021-03-30 NOTE — P.DS
Providers


Attending physician: 


Lavinia Spicer





Primary care physician: 


Cydney Loving








Plan - Discharge Summary


Discharge Rx Participant: No


New Discharge Prescriptions: 


No Action


   Multivit with Calcium,Iron,Min [Women's Multivitamin] 1 each PO QAM


   Tamoxifen [Nolvadex] 20 mg PO DAILY


Discharge Medication List





Multivit with Calcium,Iron,Min [Women's Multivitamin] 1 each PO QAM 08/06/20 

[History]


Tamoxifen [Nolvadex] 20 mg PO DAILY 02/18/21 [History]








Follow up Appointment(s)/Referral(s): 


Lavinia Spicer MD [STAFF PHYSICIAN] - 1 Week


Activity/Diet/Wound Care/Special Instructions: 


do not drive today or if taking narcotic pain medication


wear bra at all times


may shower after 48 hours


Discharge Disposition: HOME SELF-CARE

## 2021-03-30 NOTE — P.OP
Date of Procedure: 03/30/21


Preoperative Diagnosis: 


assymetry of the nipple areolar complex location after cancer surgery on the 

right breast


Postoperative Diagnosis: 


same


Procedure(s) Performed: 


Left breast mastopexy


Anesthesia: REMA


Surgeon: Lavinia Spicer


Estimated Blood Loss (ml): 5


IV fluids (ml): 500


Pathology: other (De-epithelialized skin left breast)


Condition: stable


Disposition: same day


Indications for Procedure: 


Asymmetry of the nipple areolar complex on the left after right breast cancer 

surgery


Operative Findings: 


Asymmetry of the nipple areolar complex on the left after cancer surgery on the 

right


Description of Procedure: 


Parris is a 50-year-old white female status post cancer surgery in the right 

breast.  She has asymmetry of the nipple areolar complex and is scheduled for a 

mastopexy procedure.  In the preoperative area skin markings were made.  

Elevation of the nipple areolar complex to be approximately 3 cm.  Following the

skin markings the patient was brought to the operative suite.  Following 

induction of anesthesia both breasts were prepped and draped in a sterile 

fashion.  The left breast was then approached.  The area which had been marked 

was de-epithelialized.  Incision was made through the subcutaneous tissue into 

the area of the breast parenchyma.  The Vicryl suture was used to close this 

incision.  This was followed by closure with a running 4-0 Monocryl.  A nylon 

skin suture was then placed.  Comparison with the contralateral side revealed 

this to be in the correct location.  9 mL of 1% lidocaine was injected into the 

area of concern.  Sterile dressing was applied.  A surgical bra was placed.  The

patient tolerated the procedure in stable condition.  All instrument and sponge 

counts were correct at the end of the case.

## 2021-04-15 ENCOUNTER — HOSPITAL ENCOUNTER (OUTPATIENT)
Dept: HOSPITAL 47 - WWCWWP | Age: 51
End: 2021-04-15
Attending: SURGERY
Payer: COMMERCIAL

## 2021-04-15 VITALS
HEART RATE: 78 BPM | RESPIRATION RATE: 18 BRPM | SYSTOLIC BLOOD PRESSURE: 105 MMHG | DIASTOLIC BLOOD PRESSURE: 66 MMHG | TEMPERATURE: 98.1 F

## 2021-04-15 DIAGNOSIS — Z48.02: Primary | ICD-10-CM

## 2021-04-15 NOTE — P.PN
Progress Note - Text


Progress Note Date: 04/15/21





 Parris is a 50 -year-old white female who underwent a symmetry procedure of 

the left breast secondary to asymmetry after a right breast lumpectomy.  This 

was performed and 43485.  Post procedure she is doing well.





A vertical examination:


Lungs: Clear


Heart: Regular rate and rhythm


Incisions: Clean and dry





Plan:


 Suture removal


 follow up with radiation oncology 


she is on hormonal therapy/tamoxifen, she is not having any complaints; she will

follow up with medical oncology


 right breast mammogram in July 2021 with examination





CC: Dr. Loving

## 2021-08-12 ENCOUNTER — HOSPITAL ENCOUNTER (OUTPATIENT)
Dept: HOSPITAL 47 - RADMAMWWP | Age: 51
Discharge: HOME | End: 2021-08-12
Attending: SURGERY
Payer: COMMERCIAL

## 2021-08-12 DIAGNOSIS — Z85.3: ICD-10-CM

## 2021-08-12 DIAGNOSIS — R92.2: Primary | ICD-10-CM

## 2021-08-12 PROCEDURE — 77066 DX MAMMO INCL CAD BI: CPT

## 2021-08-13 NOTE — MM
Reason for exam: follow-up at short interval from prior study.

Last mammogram was performed 5 months ago.



History:

Patient is postmenopausal, has history of breast cancer at age 50, and has history

of high-risk lesion on a previous biopsy at age 49.

Malignant MG pre op needle loc RT of the right breast, October 6, 2020.  

Lumpectomy of the right breast, October 6, 2020.  High risk MG stereo VAD BX addl 

RT of the right breast, July 31, 2020.  Benign MG stereo VAD BX RT of the right 

breast, July 31, 2020.  Breast lift of the left breast, 2020.  Radiation therapy 

of the right breast.

Taking antineoplastic beginning at age 50.



Physical Findings:

Nurse did not find any significant physical abnormalities on exam.



MG Diagnostic Mammo w CAD ROBERT

Bilateral CC and MLO view(s) were taken.

Prior study comparison: March 10, 2021, left breast MG diagnostic mammo LT w CAD. 

July 10, 2020, right breast MG work up mamm w CAD RT.  June 26, 2019, mammogram, 

performed at [facility].

The breast tissue is heterogeneously dense. This may lower the sensitivity of 

mammography.  Post surgical and post therapy change right breast. Follow up to 

assess for any evolving post therapy change. Left medial focal asymmetry disperses

with an appearance similar to 2019.



These results were verbally communicated with the patient and result sheet given 

to the patient on 8/12/21.





ASSESSMENT: Probably benign, BI-RAD 3



RECOMMENDATION:

Follow-up diagnostic mammogram of the right breast in 6 months.

## 2021-08-19 ENCOUNTER — HOSPITAL ENCOUNTER (OUTPATIENT)
Dept: HOSPITAL 47 - RADXRMAIN | Age: 51
Discharge: HOME | End: 2021-08-19
Attending: NURSE PRACTITIONER
Payer: COMMERCIAL

## 2021-08-19 DIAGNOSIS — R09.89: Primary | ICD-10-CM

## 2021-08-19 PROCEDURE — 71046 X-RAY EXAM CHEST 2 VIEWS: CPT

## 2021-08-20 NOTE — XR
EXAMINATION TYPE: XR chest 2V

 

DATE OF EXAM: 8/19/2021

 

COMPARISON: NONE

 

HISTORY: Cough and congestion.

 

TECHNIQUE:  Frontal and lateral views of the chest are obtained.

 

FINDINGS:  There is no suspicious peripheral focal air space opacity, pleural effusion, or pneumothor
ax seen.  The cardiac silhouette size is within normal limits.   The osseous structures are intact. S
urgical clips overlie the right breast.

 

IMPRESSION:  No acute pulmonary process.

## 2021-08-26 ENCOUNTER — HOSPITAL ENCOUNTER (OUTPATIENT)
Dept: HOSPITAL 47 - WWCWWP | Age: 51
End: 2021-08-26
Attending: SURGERY
Payer: COMMERCIAL

## 2021-08-26 VITALS
HEART RATE: 80 BPM | RESPIRATION RATE: 18 BRPM | TEMPERATURE: 98.5 F | DIASTOLIC BLOOD PRESSURE: 67 MMHG | SYSTOLIC BLOOD PRESSURE: 106 MMHG

## 2021-08-26 DIAGNOSIS — M06.9: ICD-10-CM

## 2021-08-26 DIAGNOSIS — C50.911: Primary | ICD-10-CM

## 2021-08-26 DIAGNOSIS — Z88.0: ICD-10-CM

## 2021-08-26 DIAGNOSIS — Z91.018: ICD-10-CM

## 2021-08-26 DIAGNOSIS — N63.21: ICD-10-CM

## 2021-08-26 DIAGNOSIS — Z88.5: ICD-10-CM

## 2021-08-26 DIAGNOSIS — F17.200: ICD-10-CM

## 2021-08-26 NOTE — P.PN
Subjective


Progress Note Date: 21


Principal diagnosis: 





Right breast stage 0 cancer


Stage 0 right breast cancer


 On 11-3-20 Parris underwent a right breast lumpectomy and sentinel node 

biopsy for stage 0 DCIS.  She subsequently underwent a symmetry procedure on the

left side.  At this time she is doing well.  She is not complaining of any lumps

masses or nodules of concern in either breast.  [place] 

[place] on [id number].  This was felt to be probably benign.  BIRADS 3 and 

follow-up right breast mammogram in 6 months was recommended.  She finished a 

course of radiation therapy in the right breast and continues to follow with 

radiation oncology.  The last no from them is on [id number] and is reviewed.


Note from medical oncology is from [id number].  She had been taking tamoxifen but 

was recently changed to exmestane secondary to her menopausal status.  She is 

tolerating this without difficulty. 





Caffeine: One pop per day


Smoke: She smokes in the evening and is exposed to secondhand smoke


Theophylline: Occasional


Hormones: Negative





Family History:


none





Hormonal history:


menarche: 11


, breast fed: no, first born at 23


periods regular


BCP: 5 years, tubal-ligation done 23 years ago


hormones:none





Surgical History:


1. tubal-ligation


2. knee arthroscopic


3. bilateral bresat surgery, right lumpectomy and SNB and left symmetry 

procedure





Medial History:


1. rheumatoid arthritis








Social History:


smoke: at night


alcohol: none


drugs: none





 


 








- Constitutional


Constitutional: Denies chills, Denies fever





- EENT


Comment: 





wears glasses


Eyes: denies blurred vision, denies pain


Ears: deny: decreased hearing, tinnitus


Ears, nose, mouth and throat: Denies headache, Denies sore throat





- Breasts


Breasts: bilateral: as per HPI





- Cardiovascular


Cardiovascular: Denies chest pain, Denies shortness of breath





- Respiratory


Respiratory: Denies cough





- Gastrointestinal


Gastrointestinal: Denies abdominal pain, Denies diarrhea, Denies nausea, Denies 

vomiting





- Genitourinary (Female)


Genitourinary: Denies dysuria, Denies hematuria





- Menstruation


Menstruation: Reports postmenopausal





- Musculoskeletal


Comment: 





arthritis





- Integumentary


Integumentary: Denies pruritus, Denies rash





- Neurological


Neurological: Denies numbness, Denies weakness





- Psychiatric


Psychiatric: Denies anxiety, Denies depression





- Endocrine


Endocrine: Denies fatigue, Denies weight change





- Hematologic/Lymphatic


Comment: 





none





- Allergic/Immunologic


Allergic/Immunologic: Reports as per HPI











Objective





- Vital Signs


Vital signs: 


                                   Vital Signs











Temp  98.5 F   21 13:12


 


Pulse  80   21 13:12


 


Resp  18   21 13:12


 


BP  106/67   21 13:12


 


Pulse Ox  97   21 13:12








                                 Intake & Output











 21





 18:59 06:59 18:59


 


Weight   63.503 kg














- Exam





BMI 24.8





- Constitutional


General appearance: Present: cooperative





- EENT


Eyes: Present: EOMI


ENT: Present: hearing grossly normal





- Neck


Neck: Present: normal ROM





- Respiratory


Respiratory: bilateral: CTA





- Cardiovascular


Rhythm: regular


Heart sounds: normal: S1, S2





- Gastrointestinal


General gastrointestinal: Present: soft





- Integumentary


Integumentary: Present: normal turgor





- Musculoskeletal


Musculoskeletal: Present: gait normal





- Psychiatric


Psychiatric: Present: A&O x's 3, appropriate affect, intact judgment & insight





- Additional findings


Additional findings: 





Breast Exam:


BRA: 36B


inspection: well healed scars both breast


Palpation:


right breast: Positional exam fibrocystic changes, no dominant masses or nodules

of concern


Right axilla: No adenopathy of concern


Left breast: Well-healed scar from a symmetry procedure, increased nodularity 

upper outer quadrant


Left axilla: No adenopathy of concern











Assessment and Plan


Assessment: 





Impression:


1.  Stage 0 right breast cancer


2.  Repeat right breast mammogram in 6 months with physician exam at that time


3.  Palpable nodularity left breast upper outer quadrant recommend ultrasound at

this time





Plan:


1.  Ultrasound left breast area of palpable abnormality which is upper-outer 

quadrant


2.  Repeat right breast mammogram in 6 months


3.  Patient to follow up after ultrasound





CC: Dr. Loving

## 2021-08-30 ENCOUNTER — HOSPITAL ENCOUNTER (OUTPATIENT)
Dept: HOSPITAL 47 - RADUSWWP | Age: 51
Discharge: HOME | End: 2021-08-30
Attending: SURGERY
Payer: COMMERCIAL

## 2021-08-30 DIAGNOSIS — Z78.0: ICD-10-CM

## 2021-08-30 DIAGNOSIS — Z85.3: ICD-10-CM

## 2021-08-30 DIAGNOSIS — R92.8: Primary | ICD-10-CM

## 2021-08-31 NOTE — USB
Reason for exam: clinical finding.



History:

Patient is postmenopausal, has history of breast cancer at age 50, and has history

of high-risk lesion on a previous biopsy at age 49.

Malignant MG pre op needle loc RT of the right breast, October 6, 2020.  

Lumpectomy of the right breast, October 6, 2020.  High risk MG stereo VAD BX addl 

RT of the right breast, July 31, 2020.  Benign MG stereo VAD BX RT of the right 

breast, July 31, 2020.  Breast lift of the left breast, 2020.  Radiation therapy 

of the right breast.

Taking antineoplastic beginning at age 50.

Indicated problem(s): palpable abnormality in the left breast.



Physical Findings:

Nurse Summary: 0.5cm firm nodule, movable (nurse dw).



US Breast Limited LT

Left limited breast ultrasound including focal area of concern, retroareolar and 

axilla demonstrates dense tissue that extends to skin line at 1 o'clock, no focal 

mass seen.



These results were verbally communicated with the patient and result sheet given 

to the patient on 8/30/21.





ASSESSMENT: Probably benign, BI-RAD 3



RECOMMENDATION:

Follow-up diagnostic mammogram and ultrasound of the left breast in 6 months.

Manage patient on a clinical basis.

## 2022-03-04 ENCOUNTER — HOSPITAL ENCOUNTER (OUTPATIENT)
Dept: HOSPITAL 47 - RADMAMWWP | Age: 52
Discharge: HOME | End: 2022-03-04
Attending: SURGERY
Payer: COMMERCIAL

## 2022-03-04 DIAGNOSIS — Z85.3: ICD-10-CM

## 2022-03-04 DIAGNOSIS — Z78.0: ICD-10-CM

## 2022-03-04 DIAGNOSIS — R92.8: Primary | ICD-10-CM

## 2022-03-04 PROCEDURE — 77062 BREAST TOMOSYNTHESIS BI: CPT

## 2022-03-04 PROCEDURE — 77066 DX MAMMO INCL CAD BI: CPT

## 2022-03-04 NOTE — USB
Reason for exam: follow-up at short interval from prior study.



History:

Patient is postmenopausal, has history of breast cancer at age 50, and has history

of high-risk lesion on a previous biopsy at age 49.

Malignant MG pre op needle loc RT of the right breast, October 6, 2020.  

Lumpectomy of the right breast, October 6, 2020.  High risk MG stereo VAD BX addl 

RT of the right breast, July 31, 2020.  Benign MG stereo VAD BX RT of the right 

breast, July 31, 2020.  Breast lift of the left breast, 2020.  Radiation therapy 

of the right breast.

Taking antineoplastic beginning at age 50.



US Breast Limited LT

Left limited breast ultrasound including focal area of concern, retroareolar and 

axilla demonstrates no cystic or solid lesion seen. Dense tissue. Scanned 12-3 

o'clock.



These results were verbally communicated with the patient and result sheet given 

to the patient on 3/4/22.





ASSESSMENT: Negative, BI-RAD 1



RECOMMENDATION:

Follow-up diagnostic mammogram of both breasts in 6 months.

## 2022-03-04 NOTE — MM
Reason for exam: follow-up at short interval from prior study.

Last mammogram was performed 7 months ago.



History:

Patient is postmenopausal, has history of breast cancer at age 50, and has history

of high-risk lesion on a previous biopsy at age 49.

Malignant MG pre op needle loc RT of the right breast, October 6, 2020.  

Lumpectomy of the right breast, October 6, 2020.  High risk MG stereo VAD BX addl 

RT of the right breast, July 31, 2020.  Benign MG stereo VAD BX RT of the right 

breast, July 31, 2020.  Breast lift of the left breast, 2020.  Radiation therapy 

of the right breast.

Taking antineoplastic beginning at age 50.



Physical Findings:

A clinical breast exam by your physician is recommended on an annual basis and 

results should be correlated with mammographic findings.



MG 3D Diag Mammo W/Cad ROBERT

Bilateral CC and MLO view(s) were taken.

Prior study comparison: August 12, 2021, bilateral MG diagnostic mammo w CAD ROBERT. 

March 10, 2021, left breast MG diagnostic mammo LT w CAD.

The breast tissue is extremely dense which could obscure a lesion on mammography. 

Stable post lumpectomy changes right breast.

No significant new findings when compared with previous films.



These results were verbally communicated with the patient and result sheet given 

to the patient on 3/4/22.





ASSESSMENT: Incomplete: need additional imaging evaluation, BI-RAD 0



RECOMMENDATION:

Ultrasound of the left breast.

## 2022-05-06 ENCOUNTER — HOSPITAL ENCOUNTER (OUTPATIENT)
Dept: HOSPITAL 47 - RADBDWWP | Age: 52
Discharge: HOME | End: 2022-05-06
Attending: OBSTETRICS & GYNECOLOGY
Payer: COMMERCIAL

## 2022-05-06 DIAGNOSIS — M85.89: Primary | ICD-10-CM

## 2022-05-06 PROCEDURE — 77080 DXA BONE DENSITY AXIAL: CPT

## 2022-05-06 NOTE — BD
EXAMINATION TYPE: Axial Bone Density

 

DATE OF EXAM: 5/6/2022

 

COMPARISON: NONE

 

CLINICAL HISTORY: 51 years year old Female.  ICD-10 CODE: N95.1 MENOPAUSAL AND FEMALE CLIMACTERIC STA
TE

 

 

Height:  63

Weight:  148

 

FRAX RISK QUESTIONS:

Alcohol (3 or more units per day):  NO

Family History (Parent hip fracture):  NO

Glucocorticoids (More than 3mos):  NO

           (Ex: prednisone, prednisolone, methylprednisolone, dexamethasone, and hydrocortisone).    
     

History of Fracture in Adulthood: NO

Secondary Osteoporosis: NO

  1.  Type 1 Diabetes: NO

  2.  Hyperthyroidism: NO

  3.  Menopause before 45: NO

  4.  Malnutrition:

  5.  Chronic liver disease: NO

Rheumatoid Arthritis: YES

Current Tobacco Use: YES

 

RISK FACTORS 

HISTORY OF: 

Surgery to Spine/Hip(right/left)/Wrist (right/left): NO

Family History of Osteoporosis: NO

Active: YES

Diet low in dairy products/other sources of calcium:  NO

Postmenopausal woman: YES

 

Take estrogen and/or progesterone medications: NO

 

Lost more than 2 inches in height since high school: NO

Frequent falls: NO

Poor Health: NO

Hyperparathyroidism: NO

Adrenal Insufficiency: NO

 

MEDICATIONS: 

AROMASIN 

 

Additional History: BREAST CANCER 2020, NO CHEMO, YES RADIATION

 

 

EXAM MEASUREMENTS: 

Bone mineral densitometry was performed using the [employer] System.

Bone mineral density as measured about the Lumbar spine is:  

----- L1-L4(G/cm2): 0.922

T Score Values are as follows:

----- L1: -1.9

----- L2: -2.3

----- L3: -2.1

----- L4: -2.4

----- L1-L4: -2.2

BASELINE

 

Bone mineral density about the R hip (g/cm2): 0.751

Bone mineral density about the L hip (g/cm2): 0.775

T Score values are as follows:

-----R Neck: -2.1

-----L Neck: -1.9

-----R Total: -2.3

-----L Total: -2.2

BASELINE

 

FRAX%s: The graph provided illustrates a 10.0% chance for a major osteoporotic fx and a 2.4% chance f
or the hips probability for fx in 10 years time.

 

IMPRESSION:

osteopenia

 

NOTE:  T-SCORE=SD OF THE YOUNG ADULT MEAN.

## 2024-05-07 ENCOUNTER — HOSPITAL ENCOUNTER (OUTPATIENT)
Dept: HOSPITAL 47 - RADBDWWP | Age: 54
Discharge: HOME | End: 2024-05-07
Attending: INTERNAL MEDICINE
Payer: COMMERCIAL

## 2024-05-07 DIAGNOSIS — Z78.0: ICD-10-CM

## 2024-05-07 DIAGNOSIS — M85.89: ICD-10-CM

## 2024-05-07 DIAGNOSIS — Z71.3: ICD-10-CM

## 2024-05-07 DIAGNOSIS — Z85.3: ICD-10-CM

## 2024-05-07 DIAGNOSIS — Z12.31: Primary | ICD-10-CM

## 2024-05-07 DIAGNOSIS — D05.11: ICD-10-CM

## 2024-05-07 DIAGNOSIS — M81.0: ICD-10-CM

## 2024-05-07 PROCEDURE — 77063 BREAST TOMOSYNTHESIS BI: CPT

## 2024-05-07 PROCEDURE — 77080 DXA BONE DENSITY AXIAL: CPT

## 2024-05-07 PROCEDURE — 77067 SCR MAMMO BI INCL CAD: CPT

## 2024-05-07 NOTE — BD
EXAMINATION TYPE: Axial Bone Density

 

DATE OF EXAM: 5/7/2024

 

CLINICAL HISTORY: 53 years old Female.  ICD-10 CODE: M85.88 DISORDER BONE

 

Height:  63"

Weight:  151lbs

 

FRAX RISK QUESTIONS:

 

Alcohol (3 or more units per day):  No

Family History (Parent hip fracture):  No

Glucocorticoids (More than 3mos):  No

           (Ex: prednisone, prednisolone, methylprednisolone, dexamethasone, and hydrocortisone).    
     

History of Fracture in Adulthood: No

Secondary Osteoporosis:

  1.  Type 1 Diabetes: No

  2.  Hyperthyroidism: No

  3.  Menopause before 45: No

  4.  Malnutrition: No

  5.  Chronic liver disease: No

Rheumatoid Arthritis: No

Current Tobacco Use: Yes

 

RISK FACTORS 

 

HISTORY OF: 

Hip Fracture (Right/Left): No

Spine Fracture: No

History of Wrist Fracture: No

Surgery to Spine/Hip(right/left)/Wrist (right/left): No

 

 

MEDICATIONS: 

Thyroid Medications: No 

Osteoporosis Medications: Yes

Which medication:  Unknown name

How Long: About 2 years 

 

EXAM MEASUREMENTS: 

Bone mineral densitometry was performed using the [employer] System.

Bone mineral density as measured about the Lumbar spine is:  

----- L1-L4(G/cm2): 0.889

T Score Values are as follows:

----- L1: -1.9

----- L2: -2.3

----- L3: -2.3

----- L4: -3.2

----- L1-L4: -2.4

 

Z Score Values are as follows:

----- L1: -1.2

----- L2: -1.5

----- L3: -1.6

----- L4: -2.5

----- L1-L4: -1.7

 

Bone mineral density has: decreased -3.6% since study of: 5/6/2022

 

Bone mineral density about the R hip (g/cm2): 07.02

Bone mineral density about the L hip (g/cm2): 0.759

T Score values are as follows:

-----R Neck: -2.3

-----L Neck: -1.7

-----R Total: -2.4

-----L Total: -2.0

 

Z Score values are as follows:

-----R Neck: -1.3

-----L Neck: -0.8

-----R Total: -1.8

-----L Total: -1.4

 

Bone mineral density has: increased 0.7% since study of: 5/6/2022

 

FRAX%s: The graph provided illustrates a 9.4% chance for a major osteoporotic fx and a 2.3% chance fo
r the hips probability for fx in 10 years time.

 

 

 

 

IMPRESSION:

Osteoporosis (T Score less than -2.5).

 

There is increased fracture risk and therapy is usually indicated based on age.

 

Re-Screen 1-2 years.

 

NOTE:  T-SCORE=SD OF THE YOUNG ADULT MEAN.

## 2024-05-09 NOTE — MM
Reason for Exam: Screening  (asymptomatic). 

Last mammogram was performed 1 year(s) and 2 month(s) ago. 





Patient History: 

Menarche at age 11. First Full-Term Pregnancy at age 23. Postmenopausal. Breast cancer, right, age

50. Previous chest radiation therapy at age 50. 10/06/2020, Lumpectomy on the Right side. 10/6/2020,

Malignant Core Biopsy on the right side. 7/31/2020, High risk Core Biopsy on the right side.

7/31/2020, Benign Core Biopsy on the right side. Radiation Therapy, right. 





Prior Study Comparison: 

8/12/2021 Bilateral Diagnostic Mammogram, [facility]. 3/4/2022 Bilateral Diagnostic Mammogram, [facility]

3/6/2023 Bilateral MG diagnostic mammo w CAD ROBERT, [facility]. 





Tissue Density: 

The breasts are extremely dense, which lowers the sensitivity of mammography.





Findings: 

Analyzed By CAD. 

There is no suspicious group of microcalcifications or new suspicious mass in either breast.

Postsurgical changes right breast with architectural distortion and surgical clips stable.

Benign-appearing calcifications. 





Overall Assessment: Benign, BI-RAD 2





Management: 

Screening Mammogram of both breasts in 1 year.

.



Patient should continue monthly self-breast exams.  A clinical breast exam by your physician is

recommended on an annual basis.

This exam should not preclude additional follow-up of suspicious palpable abnormalities.



Note on Sophy scores and lifetime risk:

1. A Sophy score greater than 3% is considered moderate risk. If this is the case, consider

specialist referral to assess eligibility for a risk reducing agent.

2. If overall lifetime risk for the development of breast cancer is 20% or higher, the patient may

qualify for future screening with alternating mammogram and breast MRI.



Electronically signed and approved by: Quan Wood M.D. Radi[facility]

## 2024-05-16 ENCOUNTER — HOSPITAL ENCOUNTER (OUTPATIENT)
Dept: HOSPITAL 47 - WWCWWP | Age: 54
End: 2024-05-16
Attending: SURGERY
Payer: COMMERCIAL

## 2024-05-16 VITALS
HEART RATE: 61 BPM | SYSTOLIC BLOOD PRESSURE: 133 MMHG | TEMPERATURE: 97.9 F | DIASTOLIC BLOOD PRESSURE: 86 MMHG | RESPIRATION RATE: 16 BRPM

## 2024-05-16 DIAGNOSIS — Z88.0: ICD-10-CM

## 2024-05-16 DIAGNOSIS — F17.200: ICD-10-CM

## 2024-05-16 DIAGNOSIS — Z88.5: ICD-10-CM

## 2024-05-16 DIAGNOSIS — Z91.048: ICD-10-CM

## 2024-05-16 DIAGNOSIS — N60.11: Primary | ICD-10-CM

## 2024-05-16 NOTE — P.PN
Subjective


Progress Note Date: 24


Principal diagnosis: 





DCIS right breast


24


Principal diagnosis: 





DCIS of the right breast


Right breast stage 0 cancer





 On 11-3-20 Parris underwent a right breast lumpectomy and sentinel node 

biopsy for stage 0 DCIS.  She subsequently underwent a symmetry procedure on the

left side.  At this time she is doing well.  She is not complaining of any lumps

masses or nodules of concern in either breast.  Bilateral mammogram on 24 

BIRAD 2.  Personally reviewed.  


 She finished a course of radiation therapy in the right breast and continues to

follow with radiation oncology.





She is presently on Exemestane she is tolerating this without difficulty. 





She has not noted any new lumps masses or nodules of concern in either breast.  





Note 23 medical oncology reviewed





Caffeine: One pop per day


Smoke: She smokes in the evening and is exposed to secondhand smoke


chocolate: Occasional


Hormones: Negative





Family History:


none





Hormonal history:


menarche: 11


, breast fed: no, first born at 23


periods regular


BCP: 5 years, tubal-ligation done 23 years ago


hormones:none





Surgical History:


1. tubal-ligation


2. knee arthroscopic


3. bilateral breast surgery, right lumpectomy and SNB and left symmetry 

procedure





Medial History:


1. rheumatoid arthritis








Social History:


smoke: at night


alcohol: none


drugs: none





 


 








- Constitutional


Constitutional: Denies chills, Denies fever





- EENT


Comment: 





wears glasses


Eyes: denies blurred vision, denies pain


Ears: deny: decreased hearing, tinnitus


Ears, nose, mouth and throat: Denies headache, Denies sore throat





- Breasts


Breasts: bilateral: as per HPI





- Cardiovascular


Cardiovascular: Denies chest pain, Denies shortness of breath





- Respiratory


Respiratory: Denies cough





- Gastrointestinal


Gastrointestinal: Denies abdominal pain, Denies diarrhea, Denies nausea, Denies 

vomiting





- Genitourinary (Female)


Genitourinary: Denies dysuria, Denies hematuria





- Menstruation


Menstruation: Reports postmenopausal





- Musculoskeletal


Comment: 





arthritis





- Integumentary


Integumentary: Denies pruritus, Denies rash





- Neurological


Neurological: Denies numbness, Denies weakness





- Psychiatric


Psychiatric: Denies anxiety, Denies depression





- Endocrine


Endocrine: Denies fatigue, Denies weight change





- Hematologic/Lymphatic


Comment: 





none





- Allergic/Immunologic


Allergic/Immunologic: Reports as per HPI























Objective





- Constitutional


General appearance: Present: cooperative





- EENT


Eyes: Present: EOMI


ENT: Present: hearing grossly normal





- Neck


Neck: Present: normal ROM





- Respiratory


Respiratory: bilateral: CTA





- Cardiovascular


Heart sounds: normal: S1, S2





- Integumentary


Integumentary: Present: normal turgor





- Musculoskeletal


Musculoskeletal: Present: gait normal





- Psychiatric


Psychiatric: Present: A&O x's 3, appropriate affect, intact judgment & insight





- Additional findings


Additional findings: 


Breast Exam:


BRA: 34C


inspection: bilateral grade 2 ptosis, well healed scar bilateral breast


palpation:


right breast: Multi-positional exam no dominant masses or nodules of concern


Right axilla: No adenopathy of concern


Left breast: Multi-positional exam fibrocystic changes no dominant masses or 

nodules of concern


Left axilla: No adenopathy of concern











Assessment and Plan


Assessment: 


Impression:


Fibrocystic breast changes bilateral


No evidence of recurrent DCIS right breast


bilateal mammogram on 24 BIRAD 2





Plan:


Continue Exemestane/ follow with medical oncology


Continue follow up with radiation oncology


Follow-up here in 6 months


Bilateral mammogram 1 year with physician exam at that time





CC: Dr. Martinez

## 2024-06-14 ENCOUNTER — HOSPITAL ENCOUNTER (OUTPATIENT)
Dept: HOSPITAL 47 - RADUSWWP | Age: 54
Discharge: HOME | End: 2024-06-14
Attending: FAMILY MEDICINE
Payer: COMMERCIAL

## 2024-06-14 DIAGNOSIS — R22.41: Primary | ICD-10-CM

## 2024-06-14 DIAGNOSIS — M79.661: ICD-10-CM

## 2024-06-14 NOTE — US
EXAMINATION TYPE: US venous doppler duplex LE RT

 

DATE OF EXAM: 6/14/2024 12:30 PM

 

COMPARISON: NONE

 

CLINICAL INDICATION: Female, 53 years old with history of R22.41 SWELLING M79.661 PAIN IN RLE; Pt sta
josué pain right calf

 

SIDE PERFORMED: Right  

 

TECHNIQUE:  The lower extremity deep venous system is examined utilizing real time linear array sonog
pantera with graded compression, doppler sonography and color-flow sonography.

 

VESSELS IMAGED:

Common Femoral Vein

Deep Femoral Vein

Greater Saphenous Vein *

Femoral Vein

Popliteal Vein

Small Saphenous Vein *

Proximal Calf Veins

(* superficial vessels)

 

 

 

Right Leg:  Negative for DVT, complex fluid collection right posterior calf in area of pt's pain= 5.4
 x 0.9 x 3.0 cm ?etiology  

 

 

 

 

IMPRESSION:  Grayscale, color doppler, spectral doppler imaging performed of the deep veins of the lo
wer extremities.  There is normal flow, compressibility, vascular waveforms.

## 2024-09-10 ENCOUNTER — HOSPITAL ENCOUNTER (OUTPATIENT)
Dept: HOSPITAL 47 - ORWHC2ENDO | Age: 54
Discharge: HOME | End: 2024-09-10
Attending: SURGERY
Payer: COMMERCIAL

## 2024-09-10 VITALS — HEART RATE: 66 BPM | SYSTOLIC BLOOD PRESSURE: 123 MMHG | DIASTOLIC BLOOD PRESSURE: 77 MMHG

## 2024-09-10 VITALS — RESPIRATION RATE: 16 BRPM

## 2024-09-10 VITALS — BODY MASS INDEX: 26 KG/M2

## 2024-09-10 VITALS — TEMPERATURE: 97.5 F

## 2024-09-10 DIAGNOSIS — D12.8: ICD-10-CM

## 2024-09-10 DIAGNOSIS — Z88.0: ICD-10-CM

## 2024-09-10 DIAGNOSIS — M06.9: ICD-10-CM

## 2024-09-10 DIAGNOSIS — F17.200: ICD-10-CM

## 2024-09-10 DIAGNOSIS — D12.2: Primary | ICD-10-CM

## 2024-09-10 DIAGNOSIS — D12.5: ICD-10-CM

## 2024-09-10 DIAGNOSIS — Z98.51: ICD-10-CM

## 2024-09-10 DIAGNOSIS — Z85.3: ICD-10-CM

## 2024-09-10 PROCEDURE — 44404 COLONOSCOPY W/INJECTION: CPT

## 2024-09-10 PROCEDURE — 45382 COLONOSCOPY W/CONTROL BLEED: CPT

## 2024-09-10 PROCEDURE — 88305 TISSUE EXAM BY PATHOLOGIST: CPT

## 2024-09-10 PROCEDURE — 45385 COLONOSCOPY W/LESION REMOVAL: CPT

## 2024-09-10 RX ADMIN — POTASSIUM CHLORIDE ONE MLS: 14.9 INJECTION, SOLUTION INTRAVENOUS at 10:29

## 2024-09-10 RX ADMIN — POTASSIUM CHLORIDE SCH MLS/HR: 14.9 INJECTION, SOLUTION INTRAVENOUS at 10:46

## 2024-09-10 NOTE — P.PCN
Date of Procedure: 09/10/24


Procedure(s) Performed: 


PREOPERATIVE DIAGNOSIS: Abnormal stool test


POSTOPERATIVE DIAGNOSIS: Multiple polyps, diverticulosis


PROCEDURE: Colonoscopy with snare polypectomy, clip placement x 2, spot 

injection


ANESTHESIA: MAC


SURGEON: Tavares Luu M.D.


SPECIMENS: Polyps


ENDOSCOPIC PROCEDURE:  The patient was placed on the endoscopy table in the left

decubitus position.  The Olympus colonoscope was inserted into the anus and 

passed under direct visualization to the base of the cecum.  The appendiceal tato

fice was visualized.  From that point the scope was slowly withdrawn inspecting 

all surfaces carefully.  There were no neoplastic inflammatory or polypoid 

lesions throughout the cecum.  In the ascending colon a small polyp was removed 

using the snare with cautery technique.  There was a small amount of bleeding 

from the base of the polypectomy site and a clip was deployed.  No further 

bleeding was seen.  The remainder of the ascending transverse and descending 

colon appeared normal.  In the sigmoid colon proximally at 28 cm there was a 

larger pedunculated polyp that was removed.  The stalk itself appeared normal.  

A clip was used at the stalk given the large size of this polyp.  I then 

injected with spot tattooing just at/distal to the polyp site.  The remainder of

the sigmoid colon was normal.  In the rectum there were 2 small polyps removed 

using the snare with cautery technique.  The patient had mild scattered 

diverticulosis.  Digital rectal examination was normal.  The patient was taken 

to the recovery room in stable condition per anesthesia guidelines.


RECOMMENDATIONS: Await biopsy results.  Repeat colonoscopy pending pathology 

results.

## 2024-09-10 NOTE — P.GSHP
History of Present Illness


H&P Date: 09/10/24


Chief Complaint: Abnormal stool test





53-year-old female here for colonoscopy.  She has not had a colonoscopy before. 

No bowel complaints.  Recent stool test was abnormal.





Past Medical History


Past Medical History: Cancer, Rheumatoid Arthritis (RA)


Additional Past Medical History / Comment(s): RIGHT BREAST CANCER. Heartburn.


History of Any Multi-Drug Resistant Organisms: None Reported


Past Surgical History: Breast Surgery, Orthopedic Surgery, Tubal Ligation


Additional Past Surgical History / Comment(s): Bilateral knee arthroscopy, right

 breast biospy.and lymph node removed, mastectomy.


Past Anesthesia/Blood Transfusion Reactions: Motion Sickness


Additional Past Anesthesia/Blood Transfusion Reaction / Comment(s): no blood 

transfusion


Smoking Status: Current every day smoker





- Past Family History


  ** Mother


Family Medical History: No Reported History





Medications and Allergies


                                Home Medications











 Medication  Instructions  Recorded  Confirmed  Type


 


Multivit with Calcium,Iron,Min 1 each PO QAM 08/06/20 09/10/24 History





[Women's Multivitamin]    


 


Raloxifene [Evista] 60 mg PO HS 09/04/24 09/10/24 History








                                    Allergies











Allergy/AdvReac Type Severity Reaction Status Date / Time


 


codeine Allergy  Rash/Hives Verified 09/10/24 10:35


 


Penicillins Allergy  Anaphylaxis Verified 09/10/24 10:35














Surgical - Exam


                                   Vital Signs











Temp Pulse Resp BP Pulse Ox


 


 97.5 F L  77   16   115/65   98 


 


 09/10/24 10:40  09/10/24 10:40  09/10/24 10:40  09/10/24 10:40  09/10/24 10:40

















Physical exam:


General: Well-developed, well-nourished


HEENT: Normocephalic, sclerae nonicteric


Abdomen: Nontender, nondistended


Extremities: No edema


Neuro: Alert and oriented





Assessment and Plan


(1) Abnormal stool test


Narrative/Plan: 


Will proceed with colonoscopy at this time.


Current Visit: Yes   Status: Acute   Code(s): R19.5 - OTHER FECAL ABNORMALITIES 

  SNOMED Code(s): 881092803

## 2025-01-30 ENCOUNTER — HOSPITAL ENCOUNTER (OUTPATIENT)
Dept: HOSPITAL 47 - WWCWWP | Age: 55
End: 2025-01-30
Attending: SURGERY
Payer: COMMERCIAL

## 2025-01-30 VITALS
RESPIRATION RATE: 17 BRPM | TEMPERATURE: 98.3 F | HEART RATE: 91 BPM | SYSTOLIC BLOOD PRESSURE: 154 MMHG | DIASTOLIC BLOOD PRESSURE: 92 MMHG

## 2025-01-30 DIAGNOSIS — F17.210: ICD-10-CM

## 2025-01-30 DIAGNOSIS — Z88.5: ICD-10-CM

## 2025-01-30 DIAGNOSIS — R92.323: Primary | ICD-10-CM

## 2025-01-30 DIAGNOSIS — Z88.0: ICD-10-CM

## 2025-01-30 NOTE — P.PN
Subjective


Progress Note Date: 25


Principal diagnosis: 





DCIS right breast 











Subjective


Progress Note Date: 25


Principal diagnosis: 





DCIS right breast





Principal diagnosis: 





DCIS of the right breast


Right breast stage 0 cancer





 On 11-3-20 Parris underwent a right breast lumpectomy and sentinel node 

biopsy for stage 0 DCIS.  She subsequently underwent a symmetry procedure on the

left side.  At this time she is doing well.  She is not complaining of any lumps

masses or nodules of concern in either breast.  Bilateral mammogram on 24 

BIRAD 2. personally reviewed at that time.  


 She finished a course of radiation therapy in the right breast and continues to

follow with radiation oncology.





She is presently on Exemestane she is tolerating this without difficulty. 





She has not noted any new lumps masses or nodules of concern in either breast.  





Note 23 medical oncology reviewed on last visit


Bone density 24 osteoporosis. Will follow with Dr. Amor. 





Caffeine: One pop per day


Smoke: She smokes in the evening and is exposed to secondhand smoke


chocolate: Occasional


Hormones: Negative





Family History:


none





Hormonal history:


menarche: 11


, breast fed: no, first born at 23


periods regular


BCP: 5 years, tubal-ligation done 23 years ago


hormones:none





Surgical History:


1. tubal-ligation


2. knee arthroscopic


3. bilateral breast surgery, right lumpectomy and SNB and left symmetry 

procedure





Medial History:


1. rheumatoid arthritis








Social History:


smoke: at night


alcohol: none


drugs: none





 


 








- Constitutional


Constitutional: Denies chills, Denies fever





- EENT


Comment: 





wears glasses


Eyes: denies blurred vision, denies pain


Ears: deny: decreased hearing, tinnitus


Ears, nose, mouth and throat: Denies headache, Denies sore throat





- Breasts


Breasts: bilateral: as per HPI





- Cardiovascular


Cardiovascular: Denies chest pain, Denies shortness of breath





- Respiratory


Respiratory: Denies cough





- Gastrointestinal


Gastrointestinal: Denies abdominal pain, Denies diarrhea, Denies nausea, Denies 

vomiting





- Genitourinary (Female)


Genitourinary: Denies dysuria, Denies hematuria





- Menstruation


Menstruation: Reports postmenopausal





- Musculoskeletal


Comment: 





arthritis





- Integumentary


Integumentary: Denies pruritus, Denies rash





- Neurological


Neurological: Denies numbness, Denies weakness





- Psychiatric


Psychiatric: Denies anxiety, Denies depression





- Endocrine


Endocrine: Denies fatigue, Denies weight change





- Hematologic/Lymphatic


Comment: 





none





- Allergic/Immunologic


Allergic/Immunologic: Reports as per HPI





























Objective





- Vital Signs


Vital signs: 


                                   Vital Signs











Temp  98.3 F   25 09:28


 


Pulse  91   25 09:28


 


Resp  17   25 09:28


 


BP  154/92   25 09:28


 


Pulse Ox  98   25 09:28


 


FiO2      








                                 Intake & Output











 25





 18:59 06:59 18:59


 


Weight   68.039 kg














- Constitutional


General appearance: Present: cooperative





- EENT


Eyes: Present: EOMI


ENT: Present: hearing grossly normal





- Neck


Neck: Present: normal ROM





- Respiratory


Respiratory: bilateral: CTA





- Cardiovascular


Rhythm: regular


Heart sounds: normal: S1, S2





- Integumentary


Integumentary: Present: normal turgor





- Musculoskeletal


Musculoskeletal: Present: gait normal





- Psychiatric


Psychiatric: Present: A&O x's 3, appropriate affect, intact judgment & insight





- Additional findings


Additional findings: 


Breast Exam:


BRA: 34C


inspection: bilateral grade 2 ptosis, well healed scar bilateral breast


palpation:


right breast: Multi-positional exam no dominant masses or nodules of concern


Right axilla: No adenopathy of concern


Left breast: Multi-positional exam fibrocystic changes no dominant masses or 

nodules of concern


Left axilla: No adenopathy of concern





fungal infection under both breast











Assessment and Plan


Assessment: 


Impression:


Fibrocystic breast changes bilateral


No evidence of recurrent DCIS right breast


bilateal mammogram on 24 BIRAD 2


fungal infection under both breast





Plan:


Continue Exemestane/ follow with medical oncology


Continue follow up with radiation oncology


Bilateral mammogram May 2025  with physician exam at that time


nystatin under breast as needed





  greater than 20 minutes spent reviewing patient records, examining patient and

discussing treatment plan





CC: Dr. Martinez

## 2025-05-08 ENCOUNTER — HOSPITAL ENCOUNTER (OUTPATIENT)
Dept: HOSPITAL 47 - RADMAMWWP | Age: 55
Discharge: HOME | End: 2025-05-08
Attending: SURGERY
Payer: COMMERCIAL

## 2025-05-08 DIAGNOSIS — Z85.3: ICD-10-CM

## 2025-05-08 DIAGNOSIS — R92.333: Primary | ICD-10-CM

## 2025-05-08 DIAGNOSIS — Z78.0: ICD-10-CM

## 2025-05-08 PROCEDURE — 77066 DX MAMMO INCL CAD BI: CPT

## 2025-05-08 PROCEDURE — 77062 BREAST TOMOSYNTHESIS BI: CPT

## 2025-05-15 ENCOUNTER — HOSPITAL ENCOUNTER (OUTPATIENT)
Dept: HOSPITAL 47 - WWCWWP | Age: 55
End: 2025-05-15
Attending: SURGERY
Payer: COMMERCIAL

## 2025-05-15 VITALS
DIASTOLIC BLOOD PRESSURE: 73 MMHG | HEART RATE: 79 BPM | SYSTOLIC BLOOD PRESSURE: 103 MMHG | TEMPERATURE: 98 F | RESPIRATION RATE: 17 BRPM

## 2025-05-15 DIAGNOSIS — Z88.0: ICD-10-CM

## 2025-05-15 DIAGNOSIS — N60.12: ICD-10-CM

## 2025-05-15 DIAGNOSIS — F17.200: ICD-10-CM

## 2025-05-15 DIAGNOSIS — Z88.5: ICD-10-CM

## 2025-05-15 DIAGNOSIS — N60.11: ICD-10-CM

## 2025-05-15 DIAGNOSIS — Z12.31: Primary | ICD-10-CM
